# Patient Record
Sex: MALE | Race: WHITE | NOT HISPANIC OR LATINO | URBAN - METROPOLITAN AREA
[De-identification: names, ages, dates, MRNs, and addresses within clinical notes are randomized per-mention and may not be internally consistent; named-entity substitution may affect disease eponyms.]

---

## 2019-01-06 ENCOUNTER — EMERGENCY (EMERGENCY)
Facility: HOSPITAL | Age: 39
LOS: 1 days | Discharge: HOME | End: 2019-01-06
Attending: EMERGENCY MEDICINE | Admitting: EMERGENCY MEDICINE
Payer: COMMERCIAL

## 2019-01-06 VITALS
SYSTOLIC BLOOD PRESSURE: 167 MMHG | DIASTOLIC BLOOD PRESSURE: 89 MMHG | RESPIRATION RATE: 16 BRPM | HEART RATE: 94 BPM | OXYGEN SATURATION: 98 %

## 2019-01-06 VITALS
TEMPERATURE: 98 F | DIASTOLIC BLOOD PRESSURE: 89 MMHG | SYSTOLIC BLOOD PRESSURE: 168 MMHG | OXYGEN SATURATION: 99 % | WEIGHT: 185.19 LBS | RESPIRATION RATE: 18 BRPM | HEART RATE: 100 BPM
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
## 2019-01-06 DIAGNOSIS — M25.512 PAIN IN LEFT SHOULDER: ICD-10-CM
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
## 2019-01-06 DIAGNOSIS — E10.9 TYPE 1 DIABETES MELLITUS WITHOUT COMPLICATIONS: ICD-10-CM

## 2019-01-06 DIAGNOSIS — E78.5 HYPERLIPIDEMIA, UNSPECIFIED: ICD-10-CM
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
## 2019-01-06 DIAGNOSIS — Z91.013 ALLERGY TO SEAFOOD: ICD-10-CM

## 2019-01-06 DIAGNOSIS — S43.005A UNSPECIFIED DISLOCATION OF LEFT SHOULDER JOINT, INITIAL ENCOUNTER: ICD-10-CM

## 2019-01-06 PROCEDURE — 23650 CLTX SHO DSLC W/MNPJ WO ANES: CPT | Mod: LT

## 2019-01-06 PROCEDURE — 73030 X-RAY EXAM OF SHOULDER: CPT | Mod: 26,LT,76

## 2019-01-06 PROCEDURE — 99284 EMERGENCY DEPT VISIT MOD MDM: CPT | Mod: 25,57

## 2019-01-06 PROCEDURE — 99152 MOD SED SAME PHYS/QHP 5/>YRS: CPT

## 2019-01-06 PROCEDURE — 99284 EMERGENCY DEPT VISIT MOD MDM: CPT | Mod: 25

## 2019-01-06 PROCEDURE — 99153 MOD SED SAME PHYS/QHP EA: CPT

## 2019-01-06 PROCEDURE — 23650 CLTX SHO DSLC W/MNPJ WO ANES: CPT | Mod: 54

## 2019-01-06 PROCEDURE — 73200 CT UPPER EXTREMITY W/O DYE: CPT | Mod: 26,LT

## 2019-01-06 PROCEDURE — 23570 CLTX SCAPULAR FX W/O MNPJ: CPT | Mod: LT

## 2019-01-06 RX ORDER — PROPOFOL 10 MG/ML
320 INJECTION, EMULSION INTRAVENOUS ONCE
Qty: 0 | Refills: 0 | Status: DISCONTINUED | OUTPATIENT
Start: 2019-01-06 | End: 2019-01-06

## 2019-01-06 RX ORDER — PROPOFOL 10 MG/ML
250 INJECTION, EMULSION INTRAVENOUS ONCE
Qty: 0 | Refills: 0 | Status: COMPLETED | OUTPATIENT
Start: 2019-01-06 | End: 2019-01-06

## 2019-01-06 RX ORDER — SODIUM CHLORIDE 9 MG/ML
3 INJECTION INTRAMUSCULAR; INTRAVENOUS; SUBCUTANEOUS EVERY 8 HOURS
Qty: 0 | Refills: 0 | Status: DISCONTINUED | OUTPATIENT
Start: 2019-01-06 | End: 2019-01-10

## 2019-01-06 RX ORDER — MORPHINE SULFATE 50 MG/1
4 CAPSULE, EXTENDED RELEASE ORAL ONCE
Qty: 0 | Refills: 0 | Status: DISCONTINUED | OUTPATIENT
Start: 2019-01-06 | End: 2019-01-06

## 2019-01-06 RX ORDER — SODIUM CHLORIDE 9 MG/ML
1000 INJECTION INTRAMUSCULAR; INTRAVENOUS; SUBCUTANEOUS ONCE
Qty: 0 | Refills: 0 | Status: COMPLETED | OUTPATIENT
Start: 2019-01-06 | End: 2019-01-06

## 2019-01-06 RX ORDER — PROPOFOL 10 MG/ML
320 INJECTION, EMULSION INTRAVENOUS ONCE
Qty: 0 | Refills: 0 | Status: COMPLETED | OUTPATIENT
Start: 2019-01-06 | End: 2019-01-06

## 2019-01-06 RX ADMIN — PROPOFOL 320 MILLIGRAM(S): 10 INJECTION, EMULSION INTRAVENOUS at 17:33

## 2019-01-06 RX ADMIN — SODIUM CHLORIDE 1000 MILLILITER(S): 9 INJECTION INTRAMUSCULAR; INTRAVENOUS; SUBCUTANEOUS at 19:00

## 2019-01-06 RX ADMIN — SODIUM CHLORIDE 3 MILLILITER(S): 9 INJECTION INTRAMUSCULAR; INTRAVENOUS; SUBCUTANEOUS at 18:27

## 2019-01-06 RX ADMIN — SODIUM CHLORIDE 3000 MILLILITER(S): 9 INJECTION INTRAMUSCULAR; INTRAVENOUS; SUBCUTANEOUS at 18:14

## 2019-01-06 RX ADMIN — PROPOFOL 250 MILLIGRAM(S): 10 INJECTION, EMULSION INTRAVENOUS at 21:43

## 2019-01-06 NOTE — ED PROCEDURE NOTE - NS_POSTPROCCAREGUIDE_ED_ALL_ED
Patient is now fully awake, with vital signs and temperature stable, hydration is adequate, patients Jennifre’s  score is at baseline (or greater than 8), patient and escort has received  discharge education.

## 2019-01-06 NOTE — ED ADULT NURSE REASSESSMENT NOTE - NS ED NURSE REASSESS COMMENT FT1
a total dose of 320mg was given during the sedation. times as follows:  533pm- 50mg  535pm-50mg  536pm- 50mg  544pm- 50mg  repeat xray done at 550pm. and not in place.   6pm- 50mg given.  601pm- 20mg given.   propofol pushed IV by .

## 2019-01-06 NOTE — ED PROVIDER NOTE - CARE PROVIDER_API CALL
Abiel Keita), [facility]  Orthopedics  [address]  [address] [place] [address]  Phone: [phone]  Fax: [phone]

## 2019-01-06 NOTE — ED PROVIDER NOTE - FAMILY HISTORY
Dr Gunjan Villa notified of patient reaching the floor. Update given.    No new orders No pertinent family history in first degree relatives

## 2019-01-06 NOTE — ED PROCEDURE NOTE - CPROC ED MALLAMPATI SCORE1
Class 1: Soft palate, uvula, fauces, pillars visible.
Class 1: Soft palate, uvula, fauces, pillars visible.

## 2019-01-06 NOTE — ED ADULT NURSE NOTE - NSIMPLEMENTINTERV_GEN_ALL_ED
Implemented All Universal Safety Interventions:  [place] to call system. Call bell, personal items and telephone within reach. Instruct patient to call for assistance. Room bathroom lighting operational. Non-slip footwear when patient is off stretcher. Physically safe environment: no spills, clutter or unnecessary equipment. Stretcher in lowest position, wheels locked, appropriate side rails in place.

## 2019-01-06 NOTE — ED PROVIDER NOTE - MEDICAL DECISION MAKING DETAILS
LUE: increased shoulder joint space, decreased active & passive rom at R shoulder; no elbow or snuffbox ttp, good radial pulses b/l, good sensation. anterior shoulder dislocation. initial reduction not successful. needed conscious sedation for reduction

## 2019-01-06 NOTE — ED PROVIDER NOTE - PHYSICAL EXAMINATION
LUE: increased shoulder joint space, decreased active & passive rom at R shoulder; no elbow or snuffbox ttp, good radial pulses b/l, good sensation

## 2019-01-06 NOTE — ED PROVIDER NOTE - OBJECTIVE STATEMENT
Pertinent PMH/PSH/FHx/SHx and Review of Systems contained within  37yo m pmh iddm1, HLD, L shoulder dislocation p/w L shoulder pain. states he layed down on it incorrect position & heard it pop. feels as if it may be dislocated. ROS negative for: fever, chest pain, SOB, elbow pain, wrist pain  Fh and Sh not otherwise contributory

## 2019-01-06 NOTE — CONSULT NOTE ADULT - SUBJECTIVE AND OBJECTIVE BOX
37 yo LHD male with unstable L shoulder, coracoid fracture, large hill sachs lesion and significantly medialized Bankart fracture. He rolled over in bed and states he felt his shoulder pop out. He reports one prior shoulder dislocation. He underwent multiple attempts at closed reduction by the ED with conscious sedation and multiple injections in to his shoulder with 2% lidocaine. He reports one prior shoulder dislocation 2.5 years ago and denies any trauma this time. 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      PMH HLD HTN Anxiety DM controlled hgb1ac ~6.0  PSH Denies  MEds buproprion, Simvastatin, lisinopril  All  Famhx  Social  ROS    KINSEY ORDONEZ Ax MSC M U R  +AIN PIN ULNAR nerve Function  + sulcus sign  4 injection holes lateral arm  TTP coracoid and diffusely about shoulder  BCR WWP hands, 2+ rad pulse, compartments soft and compressible    Mechanical block to internal rotation    xrays and CT reviewed by me showing multiple attempts at closed reduction, large hill sachs lesions and far medialized bony bankart. 37 yo LHD male with unstable L shoulder, coracoid fracture, large hill sachs lesion and significantly medialized Bankart fracture. He rolled over in bed and states he felt his shoulder pop out. He reports one prior shoulder dislocation. He underwent multiple attempts at closed reduction by the ED with conscious sedation and multiple injections in to his shoulder with 2% lidocaine. He reports one prior shoulder dislocation 2.5 years ago and denies any trauma this time. He is a RHD desk worker.      PMH HLD HTN Anxiety DM controlled hgb1ac ~6.0  PSH Denies  MEds buproprion, Simvastatin, lisinopril  All NKDA  Famhx: No history of joint dislocations or ligamentous  laxity   Social + etoh, denies illicitis or tob  ROS  Musculoskeletal: joint pain and joint stiffness . Review of Systems no feelings of weakness   All other review of systems are negative except as noted.   Constitutional: no chills, and no fever.   Eyes: no discharge, no pain and no redness.   ENT: no nasal discharge, no nosebleeds and no sore throat.   Cardiovascular: no chest pain, no palpitations and the heart rate was not fast.   Respiratory: no shortness of breath, no cough and no wheezing.   Gastrointestinal: no abdominal pain, no diarrhea, no vomiting and no melena.   Genitourinary: no pelvic pain, no dysuria, no abnormal urethral discharge and no frequency.   Integumentary: no skin lesions and no change in a mole.   Neurological: no dizziness, no fainting and no convulsions.   Endocrine: no deepening of the voice and no hot flashes.   Hematologic/Lymphatic: does not bleed easily, no swollen glands and no cervical lymphadenopathy.  Musculoskeletal: [as per HPI, otherwise denies additional joint pain, effusions, stiffness.]    KINSEY ORDONEZ Ax MSC M U R  +AIN PIN ULNAR nerve Function  + sulcus sign  4 injection holes lateral arm  TTP coracoid and diffusely about shoulder  BCR WWP hands, 2+ rad pulse, compartments soft and compressible      Mechanical block to internal rotation    xrays and CT reviewed by me showing multiple attempts at closed reduction, large hill sachs lesions and far medialized bony bankart. There is also a minimally displaced coracoid tip fracture.     After review of the patients imaging and physical exam, the patient continues to have a glenohumeral dislocation. We discussed the risks and benifits of closed reduction under propofol and my concern that he would require open reduction if closed reduction is unsucessful. After risk and benifits were discussed at length and all questions were answered, the patient agreed and consented to the purposed reduction    Procedure:  After appropriate sedation with propofol, the Left glenohumeral joint was reduced in standard fashion using traction counter traction Internal rotation to free up the engaged hill sachs lesion. The shoulder was reduced and confirmed utilizing xray. ROM was examined once reduced and the patient is unstable to neutral ER.  Post procedure the patient had improved pain control and ROM.  He was neurovasculily intact.     Post reduction LUE  Resolution of sulcus sign  SILT Ax MSC M U R  +AIN PIN ULNAR nerve Function  BCR WWP hands, 2+ rad pulse, compartments soft and compressible

## 2019-01-06 NOTE — CONSULT NOTE ADULT - ASSESSMENT
37 yo D male with unstable L shoulder, coracoid fracture, large hill sachs lesion and significantly medialized Bankart fracture. He rolled over in bed and states he felt his shoulder pop out. He reports one prior shoulder dislocation. He underwent multiple attempts at closed reduction by the ED with conscious sedation and multiple injections in to his shoulder with 2% lidocaine. He reports one prior shoulder dislocation 2.5 years ago and denies any trauma this time. He is a D desk worker. 38 RHD male with chronic shoulder hill sachs lesions, new coracoid fracture, unstable shoulder and far medialized bankart. We discussed maintaining his shoulder in an internally rotated position in order to ensure no redislocation as he was highly unstable during conscious sedation evaluation. He will see me first thing in the morning at 7 am and get scheduled for an MRI and care coordinated with one of my shoulder partners Dr. Gastelum and Dr. Jorgensen. I explained to the patient that he will most likely need surgery to remedy his degree of shoulder injury. 38 RHD male with chronic shoulder hill sachs lesions, new coracoid fracture, unstable shoulder and far medialized bankart. We discussed maintaining his shoulder in an internally rotated position in order to ensure no redislocation as he was highly unstable during conscious sedation evaluation. He will see me first thing in the morning at 7 am and get scheduled for an MRI and care coordinated with one of my shoulder partners Dr. Gastelum and Dr. Jorgensen. I explained to the patient that he will most likely need surgery to remedy his degree of shoulder injury.   1) NWB LUE  2) Remain in sling  3) patient to follow up in the office tomorrow

## 2019-01-06 NOTE — ED PROCEDURE NOTE - NS_POSTPROCCAREGUIDE_ED_ALL_ED
Patient is now fully awake, with vital signs and temperature stable, hydration is adequate, patients Jennifer’s  score is at baseline (or greater than 8), patient and escort has received  discharge education.

## 2019-01-06 NOTE — ED ADULT NURSE NOTE - OBJECTIVE STATEMENT
pt reports left shoulder pain, without injury, hx of dislocations. pt reports left shoulder pain, without injury, hx of dislocations. + mild deformity on visual assessment, LUE with + distal pulses and capp refill WNL. for Xray

## 2019-01-06 NOTE — ED PROCEDURE NOTE - NS ED PROCEDURE ASSISTED BY
Assistance was available
The procedure was performed independently
The procedure was performed independently

## 2019-01-07 ENCOUNTER — APPOINTMENT (OUTPATIENT)
Dept: ORTHOPEDIC SURGERY | Facility: CLINIC | Age: 39
End: 2019-01-07
Payer: COMMERCIAL

## 2019-01-07 DIAGNOSIS — S42.132A: ICD-10-CM

## 2019-01-07 DIAGNOSIS — S43.492S OTHER SPRAIN OF LEFT SHOULDER JOINT, SEQUELA: ICD-10-CM

## 2019-01-07 DIAGNOSIS — Z86.39 PERSONAL HISTORY OF OTHER ENDOCRINE, NUTRITIONAL AND METABOLIC DISEASE: ICD-10-CM

## 2019-01-07 DIAGNOSIS — M21.822 OTHER SPECIFIED ACQUIRED DEFORMITIES OF LEFT UPPER ARM: ICD-10-CM

## 2019-01-07 DIAGNOSIS — Z78.9 OTHER SPECIFIED HEALTH STATUS: ICD-10-CM

## 2019-01-07 DIAGNOSIS — M24.412 RECURRENT DISLOCATION, LEFT SHOULDER: ICD-10-CM

## 2019-01-07 PROBLEM — Z00.00 ENCOUNTER FOR PREVENTIVE HEALTH EXAMINATION: Status: ACTIVE | Noted: 2019-01-07

## 2019-01-07 PROCEDURE — 99204 OFFICE O/P NEW MOD 45 MIN: CPT

## 2019-01-07 RX ORDER — ATORVASTATIN CALCIUM 80 MG/1
TABLET, FILM COATED ORAL
Refills: 0 | Status: ACTIVE | COMMUNITY

## 2019-01-07 RX ORDER — INSULIN ASPART 100 [IU]/ML
INJECTION, SOLUTION INTRAVENOUS; SUBCUTANEOUS
Refills: 0 | Status: ACTIVE | COMMUNITY

## 2019-01-07 RX ORDER — LISINOPRIL 30 MG/1
TABLET ORAL
Refills: 0 | Status: ACTIVE | COMMUNITY

## 2019-01-07 RX ORDER — BUSPIRONE HYDROCHLORIDE 7.5 MG/1
TABLET ORAL
Refills: 0 | Status: ACTIVE | COMMUNITY

## 2019-01-08 PROBLEM — S42.132A: Status: ACTIVE | Noted: 2019-01-08

## 2019-01-08 PROBLEM — S43.492S: Status: ACTIVE | Noted: 2019-01-08

## 2019-01-08 PROBLEM — M24.412 RECURRENT DISLOCATION OF LEFT SHOULDER: Status: ACTIVE | Noted: 2019-01-08

## 2019-01-08 PROBLEM — M21.822 HILL-SACHS LESION OF LEFT SHOULDER: Status: ACTIVE | Noted: 2019-01-08

## 2019-07-12 ENCOUNTER — EMERGENCY (EMERGENCY)
Facility: HOSPITAL | Age: 39
LOS: 1 days | Discharge: HOME | End: 2019-07-12
Attending: EMERGENCY MEDICINE | Admitting: EMERGENCY MEDICINE
Payer: COMMERCIAL

## 2019-07-12 VITALS
OXYGEN SATURATION: 96 % | TEMPERATURE: 98 F | SYSTOLIC BLOOD PRESSURE: 150 MMHG | HEIGHT: 72 IN | HEART RATE: 102 BPM | DIASTOLIC BLOOD PRESSURE: 93 MMHG | WEIGHT: 195.11 LBS | RESPIRATION RATE: 18 BRPM

## 2019-07-12 PROCEDURE — 99283 EMERGENCY DEPT VISIT LOW MDM: CPT | Mod: 25

## 2019-07-12 PROCEDURE — 73030 X-RAY EXAM OF SHOULDER: CPT | Mod: 26,LT

## 2019-07-12 RX ORDER — METHOCARBAMOL 500 MG/1
750 TABLET, FILM COATED ORAL ONCE
Refills: 0 | Status: COMPLETED | OUTPATIENT
Start: 2019-07-12 | End: 2019-07-12

## 2019-07-12 RX ORDER — IBUPROFEN 200 MG
1 TABLET ORAL
Qty: 20 | Refills: 0
Start: 2019-07-12 | End: 2019-07-16

## 2019-07-12 RX ORDER — IBUPROFEN 200 MG
600 TABLET ORAL ONCE
Refills: 0 | Status: COMPLETED | OUTPATIENT
Start: 2019-07-12 | End: 2019-07-12

## 2019-07-12 RX ADMIN — METHOCARBAMOL 750 MILLIGRAM(S): 500 TABLET, FILM COATED ORAL at 16:21

## 2019-07-12 RX ADMIN — Medication 600 MILLIGRAM(S): at 16:20

## 2019-07-12 NOTE — ED ADULT NURSE NOTE - NSIMPLEMENTINTERV_GEN_ALL_ED
Implemented All Universal Safety Interventions:  [place] to call system. Call bell, personal items and telephone within reach. Instruct patient to call for assistance. Room bathroom lighting operational. Non-slip footwear when patient is off stretcher. Physically safe environment: no spills, clutter or unnecessary equipment. Stretcher in lowest position, wheels locked, appropriate side rails in place.

## 2019-07-12 NOTE — ED ADULT TRIAGE NOTE - CHIEF COMPLAINT QUOTE
c/o left shoulder pain after possibly dislocating shoulder approx 1 hr ago. has limited ROM to shoulder, increase in pain. denies numbness/tingling. sling placed in triage. h/o dislocation to affected joint.

## 2019-07-12 NOTE — ED PROVIDER NOTE - CLINICAL SUMMARY MEDICAL DECISION MAKING FREE TEXT BOX
Pt presenting with left shoulder after possible dislocation this morning. Will obtain x-ray, provide sling, give anti-inflammatory medication for pain, and ortho f/u.

## 2019-07-12 NOTE — ED PROVIDER NOTE - CARE PROVIDER_API CALL
Marvin Torres)  Orthopaedic Surgery  [address] [place] [address]  Phone: [phone]  Fax: [phone]  Follow Up Time:

## 2019-07-12 NOTE — ED PROVIDER NOTE - MUSCULOSKELETAL, MLM
Spine appears normal, range of motion is not limited, no muscle or joint tenderness Spine appears normal. Mild tenderness to anterior left shoulder. Able to range shoulder with passive motion. Able to touch other shoulder with left arm. Normal pulses distally in left upper extremity. Normal sensation. Normal capillary refill.

## 2019-07-12 NOTE — ED PROVIDER NOTE - OBJECTIVE STATEMENT
38 y/o male with PMHx of DM, HTN, and HLD presents to the ED with complaints of left shoulder pain after possibly dislocating shoulder approximately 1 hr ago. Pt states he was in bed stretching his arms when the incident occurred. He endorses when trying to put on his pants, he "heard his shoulder pop back in." Has limited but improved ROM to shoulder with slight decrease in pain since onset. Of note, Pt has a history of shoulder dislocation to left shoulder-last episode in Jan 2019 after similar incident of stretching the left shoulder. First dislocation was caused by a fall onto left shoulder in 2017. Denies paresthesia, numbness, tingling, SOB, chest pain, and malaise. 40 y/o male with PMHx of DM, HTN, and HLD presents to the ED with complaints of left shoulder pain after possibly dislocating shoulder approximately 1 hr ago. Pt states he was in bed stretching his arms when the incident occurred. He endorses when trying to put on his pants, he "heard his shoulder pop back in." Has limited but improved ROM to shoulder with slight decrease in pain since onset. Of note, Pt has a history of shoulder dislocation to left shoulder-last episode in Jan 2019 after similar incident of stretching the left shoulder. First dislocation was caused by a fall onto left shoulder in 2017. Denies paresthesia, numbness, tingling, SOB, chest pain, and malaise. Pt was last seen at [facility] on Jan 6, 2019 during previous incident and had CT of left shoulder which showed left anterior subcoracoid glenohumeral dislocation with associated Hill-Sachs and avulsed Bankart fractures. Minimally displaced coracoid process fracture.

## 2019-07-16 DIAGNOSIS — M25.512 PAIN IN LEFT SHOULDER: ICD-10-CM

## 2019-07-16 DIAGNOSIS — Z91.013 ALLERGY TO SEAFOOD: ICD-10-CM

## 2021-04-26 PROBLEM — E11.9 TYPE 2 DIABETES MELLITUS WITHOUT COMPLICATIONS: Chronic | Status: ACTIVE | Noted: 2019-07-12

## 2021-04-26 PROBLEM — E78.5 HYPERLIPIDEMIA, UNSPECIFIED: Chronic | Status: ACTIVE | Noted: 2019-07-12

## 2021-04-26 PROBLEM — I10 ESSENTIAL (PRIMARY) HYPERTENSION: Chronic | Status: ACTIVE | Noted: 2019-07-12

## 2021-04-29 ENCOUNTER — APPOINTMENT (OUTPATIENT)
Age: 41
End: 2021-04-29

## 2021-05-20 ENCOUNTER — APPOINTMENT (OUTPATIENT)
Age: 41
End: 2021-05-20

## 2022-06-24 NOTE — ED ADULT NURSE NOTE - HIV INFORMATION PROVIDED
Per CMM-PA has been denied  Preferred drug: Aimovig, emgality and Nurtec  Denial letter printed and placed in clerical bin to be scanned       Will call pt after 0800 Yes